# Patient Record
(demographics unavailable — no encounter records)

---

## 2025-07-09 NOTE — PHYSICAL EXAM
[Chaperoned Physical Exam] : A chaperone was present in the examining room during all aspects of the physical examination. [Appropriately responsive] : appropriately responsive [Alert] : alert [No Acute Distress] : no acute distress [Soft] : soft [Non-tender] : non-tender [Non-distended] : non-distended [Oriented x3] : oriented x3 [Examination Of The Breasts] : a normal appearance [No Masses] : no breast masses were palpable [Labia Majora] : normal [Normal] : normal [Uterine Adnexae] : normal [FreeTextEntry5] : IUD strings visualized

## 2025-07-09 NOTE — PLAN
[FreeTextEntry1] : 25 y/o G0, LMP 7/1 Lyletta IUD in place, here for annual.   #breast pain - no discrete mass palpated on exam - f/u breast sono  #vulvar irritation - advised to change pantiliner type to unscented or chemical-free - f/u if worsening or persistent symptoms  #HCM - next Pap due 5/2027 - f/u STD testing  RTC for annual or PRN